# Patient Record
Sex: MALE | Race: BLACK OR AFRICAN AMERICAN | Employment: PART TIME | URBAN - METROPOLITAN AREA
[De-identification: names, ages, dates, MRNs, and addresses within clinical notes are randomized per-mention and may not be internally consistent; named-entity substitution may affect disease eponyms.]

---

## 2024-08-14 ENCOUNTER — OFFICE VISIT (OUTPATIENT)
Age: 64
End: 2024-08-14
Payer: COMMERCIAL

## 2024-08-14 DIAGNOSIS — M62.81 MUSCLE WEAKNESS (GENERALIZED): ICD-10-CM

## 2024-08-14 DIAGNOSIS — N39.3 SUI (STRESS URINARY INCONTINENCE), MALE: Primary | ICD-10-CM

## 2024-08-14 PROCEDURE — 97530 THERAPEUTIC ACTIVITIES: CPT | Performed by: PHYSICAL THERAPIST

## 2024-08-14 PROCEDURE — 97161 PT EVAL LOW COMPLEX 20 MIN: CPT | Performed by: PHYSICAL THERAPIST

## 2024-08-14 NOTE — PROGRESS NOTES
Physical Therapy Evaluation  [facility] PHYSICIANS [facility], [facility] UROGYNECOLOGY AND PELVIC REHABILITATION   [address] [place] [address]  [address] [place] [address]  Dept: [phone]     Patient:  Adriano Bojorquez  : 1960    Visit Date:  2024   Referring Provider:  Aidee Medina, *   Time In: 1000  Time Out: 1053   Total time: 53 min      Treatment:  Treatment Charges Mins Units   [] Manual Therapy (06774)     [x] Therapeutic Activity (52461) 40 3   [] Self Care/Home Management Training (44169)     [] Therapeutic Exercise (29303)     [] Neuromuscular Eusebio (77800)     [] Gait Training (57539)     [x] PT-Eval (76233, 96841, 17768) 13 1   [] PT Re-Eval (34485)     [] Caregiver Training (38808)     Total Treatment Time: 53 4      Diagnoses:    Diagnosis Orders   1. IBAN (stress urinary incontinence), male        2. Muscle weakness (generalized)             Precautions:  RALP 2024    Adriano oBjorquez, 63 y.o., male presents today for PT evaluation of pelvic floor weakness and IBAN. He also reports some urgency of urination.He had a RALP in 2024 due to prostate cancer.  No c/o pain or pelvic pressure, but he notes UI with movement or strong urge. He feels he is slowly having less UI and also starting to feel when he needs to urinate and getting to the toilet in time. Using 1-2 Depends briefs in 24 hours.  No c/o FI or constipation.    Pt's concerns are for UI, urgency of urination.     Adriano Bojorquez states he drinks 80 oz  of soda ( Sprite), water per day    Past Medical and Surgical History of relevance:   No past surgical history on file.  No past medical history on file.    Pregnancy and Delivery:   OB History   No obstetric history on file.        Social History:  Social History     Socioeconomic History    Marital status: Unknown     Social Determinants of Health     Financial Resource Strain: Low Risk  (2023)    Received from [facility]

## 2024-08-14 NOTE — PATIENT INSTRUCTIONS
PELVIC FLOOR EXERCISES - MALE     Tighten your pelvic floor \"hammock\" by lifting the base of your penis.  Imagine lifting it toward your head.  Or imagine the \"quit urinating\" feeling.    AVOID:  Holding your breath  Over-squeezing your stomach muscles  Over-squeezing your gluteals  Squeezing your thighs together    On your back, On your side, Legs elevated  Reclining, Sit, Stand, Squat, March   After to urinate- stay there and relax your inner thighs, abdomen, and buttocks. Gently blow out 2 times like to make a pinwheel spin ( yoga breath). See if more urine comes out, but DO NOT strain.

## 2024-08-21 ENCOUNTER — EVALUATION (OUTPATIENT)
Age: 64
End: 2024-08-21

## 2024-08-21 DIAGNOSIS — M62.81 MUSCLE WEAKNESS (GENERALIZED): ICD-10-CM

## 2024-08-21 DIAGNOSIS — N39.3 SUI (STRESS URINARY INCONTINENCE), MALE: Primary | ICD-10-CM

## 2024-08-21 NOTE — PROGRESS NOTES
to progress functional kegels at next visit.  Pt tolerated therapy session Well with good understanding of HEP .   Pt will continue to benefit from skilled PT services to address primary complaints.      Plan:BFB with knees, lift, Tband  Patient will continue with HEP, recheck in 9/23 8/21/24 8:05 AM EDT review of systems completed                      Electronically signed by Stephy Kwok PT on 8/21/2024 at 10:20 AM

## 2024-08-21 NOTE — PATIENT INSTRUCTIONS
PELVIC FLOOR EXERCISES - MALE     Tighten your pelvic floor \"hammock\" by lifting the base of your penis.  Imagine lifting it toward your head.  Or imagine the \"quit urinating\" feeling.    AVOID:  Holding your breath  Over-squeezing your stomach muscles  Over-squeezing your gluteals  Squeezing your thighs together    On your back, On your side, Legs elevated  Reclining, Sit, Stand, Squat, March

## 2024-09-11 ENCOUNTER — EVALUATION (OUTPATIENT)
Age: 64
End: 2024-09-11
Payer: COMMERCIAL

## 2024-09-11 DIAGNOSIS — M62.81 MUSCLE WEAKNESS (GENERALIZED): ICD-10-CM

## 2024-09-11 DIAGNOSIS — N39.3 SUI (STRESS URINARY INCONTINENCE), MALE: Primary | ICD-10-CM

## 2024-09-11 PROCEDURE — 97530 THERAPEUTIC ACTIVITIES: CPT | Performed by: PHYSICAL THERAPIST
